# Patient Record
Sex: MALE | Race: WHITE | HISPANIC OR LATINO | Employment: FULL TIME | URBAN - METROPOLITAN AREA
[De-identification: names, ages, dates, MRNs, and addresses within clinical notes are randomized per-mention and may not be internally consistent; named-entity substitution may affect disease eponyms.]

---

## 2019-04-22 ENCOUNTER — TELEPHONE (OUTPATIENT)
Dept: FAMILY MEDICINE | Facility: CLINIC | Age: 59
End: 2019-04-22

## 2019-04-22 NOTE — TELEPHONE ENCOUNTER
----- Message from Yulisa Lam sent at 4/22/2019  3:31 PM CDT -----  Type:  Appointment Request    Caller is requesting a soon appointment.      Name of Caller:  Yves Richards  When is the first available appointment?  NA  Symptoms:  Itchy throat/congested and coughing  Best Call Back Number:  [phone]  Additional Information:  Unable to schedule in system/wants to see Dr. Orr ONLY

## 2019-04-23 NOTE — TELEPHONE ENCOUNTER
Attempted to contact pt but the person who answered the number said I had the wrong number. Verified the number as being [phone].

## 2022-09-21 ENCOUNTER — PATIENT MESSAGE (OUTPATIENT)
Dept: RESEARCH | Facility: HOSPITAL | Age: 62
End: 2022-09-21
Payer: COMMERCIAL

## 2023-05-31 ENCOUNTER — PATIENT MESSAGE (OUTPATIENT)
Dept: RESEARCH | Facility: HOSPITAL | Age: 63
End: 2023-05-31
Payer: COMMERCIAL